# Patient Record
Sex: MALE
[De-identification: names, ages, dates, MRNs, and addresses within clinical notes are randomized per-mention and may not be internally consistent; named-entity substitution may affect disease eponyms.]

---

## 2019-09-27 ENCOUNTER — TRANSCRIPTION ENCOUNTER (OUTPATIENT)
Age: 69
End: 2019-09-27

## 2021-03-02 PROBLEM — Z00.00 ENCOUNTER FOR PREVENTIVE HEALTH EXAMINATION: Status: ACTIVE | Noted: 2021-03-02

## 2021-03-05 ENCOUNTER — APPOINTMENT (OUTPATIENT)
Dept: NEUROSURGERY | Facility: CLINIC | Age: 71
End: 2021-03-05
Payer: MEDICARE

## 2021-03-05 VITALS
BODY MASS INDEX: 29.45 KG/M2 | HEART RATE: 61 BPM | DIASTOLIC BLOOD PRESSURE: 84 MMHG | TEMPERATURE: 99.1 F | HEIGHT: 60 IN | OXYGEN SATURATION: 98 % | SYSTOLIC BLOOD PRESSURE: 173 MMHG | WEIGHT: 150 LBS

## 2021-03-05 DIAGNOSIS — G91.9 HYDROCEPHALUS, UNSPECIFIED: ICD-10-CM

## 2021-03-05 PROCEDURE — 99072 ADDL SUPL MATRL&STAF TM PHE: CPT

## 2021-03-05 PROCEDURE — 99203 OFFICE O/P NEW LOW 30 MIN: CPT

## 2021-03-05 NOTE — PLAN
[FreeTextEntry1] : He has no symptoms and his neurologic exam is normal. He does not need any follow up unless there are other issues. His last CT shows normal ventricles and the shunt catheter in good place.

## 2021-03-05 NOTE — HISTORY OF PRESENT ILLNESS
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 He has not had any episodes or complications. He has no fever, chills or fevers. No history of headache. He has not had any issues with the shunt since placed.